# Patient Record
Sex: MALE | Race: OTHER | Employment: FULL TIME | URBAN - METROPOLITAN AREA
[De-identification: names, ages, dates, MRNs, and addresses within clinical notes are randomized per-mention and may not be internally consistent; named-entity substitution may affect disease eponyms.]

---

## 2019-09-07 ENCOUNTER — OFFICE VISIT (OUTPATIENT)
Dept: URGENT CARE | Facility: CLINIC | Age: 64
End: 2019-09-07

## 2019-09-07 VITALS
SYSTOLIC BLOOD PRESSURE: 111 MMHG | TEMPERATURE: 98 F | RESPIRATION RATE: 18 BRPM | DIASTOLIC BLOOD PRESSURE: 72 MMHG | WEIGHT: 165 LBS | OXYGEN SATURATION: 100 % | HEART RATE: 66 BPM | HEIGHT: 67 IN | BODY MASS INDEX: 25.9 KG/M2

## 2019-09-07 DIAGNOSIS — M62.838 MUSCLE SPASM: ICD-10-CM

## 2019-09-07 DIAGNOSIS — M54.6 ACUTE LEFT-SIDED THORACIC BACK PAIN: Primary | ICD-10-CM

## 2019-09-07 PROCEDURE — 96372 THER/PROPH/DIAG INJ SC/IM: CPT | Mod: S$GLB,,, | Performed by: NURSE PRACTITIONER

## 2019-09-07 PROCEDURE — 99204 PR OFFICE/OUTPT VISIT, NEW, LEVL IV, 45-59 MIN: ICD-10-PCS | Mod: 25,S$GLB,, | Performed by: NURSE PRACTITIONER

## 2019-09-07 PROCEDURE — 96372 PR INJECTION,THERAP/PROPH/DIAG2ST, IM OR SUBCUT: ICD-10-PCS | Mod: S$GLB,,, | Performed by: NURSE PRACTITIONER

## 2019-09-07 PROCEDURE — 99204 OFFICE O/P NEW MOD 45 MIN: CPT | Mod: 25,S$GLB,, | Performed by: NURSE PRACTITIONER

## 2019-09-07 RX ORDER — CYCLOBENZAPRINE HCL 10 MG
10 TABLET ORAL 3 TIMES DAILY PRN
Qty: 20 TABLET | Refills: 0 | Status: SHIPPED | OUTPATIENT
Start: 2019-09-07 | End: 2019-09-17

## 2019-09-07 RX ORDER — KETOROLAC TROMETHAMINE 30 MG/ML
60 INJECTION, SOLUTION INTRAMUSCULAR; INTRAVENOUS
Status: COMPLETED | OUTPATIENT
Start: 2019-09-07 | End: 2019-09-07

## 2019-09-07 RX ORDER — DEXAMETHASONE SODIUM PHOSPHATE 100 MG/10ML
10 INJECTION INTRAMUSCULAR; INTRAVENOUS
Status: COMPLETED | OUTPATIENT
Start: 2019-09-07 | End: 2019-09-07

## 2019-09-07 RX ADMIN — KETOROLAC TROMETHAMINE 60 MG: 30 INJECTION, SOLUTION INTRAMUSCULAR; INTRAVENOUS at 05:09

## 2019-09-07 RX ADMIN — DEXAMETHASONE SODIUM PHOSPHATE 10 MG: 100 INJECTION INTRAMUSCULAR; INTRAVENOUS at 05:09

## 2019-09-07 NOTE — PATIENT INSTRUCTIONS
Contracción De Espalda [Sin Lesión] [Back Spasm, No Injury]    La contracción de los músculos de la espalda puede ocurrir luego de dejuan torcedura herlinda de ligamento o torcedura muscular por un movimiento de giro o dejuan agachada brusca. También la causa puede ser dormir en dejuan posición loly o en un colchón de loly calidad. Algunas personas responden a la tensión emocional tensando los músculos de la espalda.  El tratamiento descrito abajo generalmente contribuirá que el dolor desaparezca en 5-7 días. El dolor que continua puede requerir dejuan evaluación mayor u otro tipo de tratamiento mina la terapia física.  A menos que usted haya tenido dejuan lesión física (por ejemplo, dejuan caída o un accidente de automóvil), no suelen pedirse radiografías (X-rays) para la evaluación inicial del dolor de espalda. Si el dolor persiste y no responde al tratamiento médico, es posible que le soliciten radiografías (X-rays) y otras pruebas más adelante.  Cuidado En [place]:  1. Es posible que necesite permanecer en cama los primeros días. Yadi, tan pronto mina le sea posible, comience a sentarse o pararse para evitar los problemas que pueden aparecer cuando luis está en cama mucho tiempo (debilidad de los músculos, mayor dolor y rigidez de la espalda, coágulos de sarbjit en las piernas).  2. Mientras esté en cama, intente buscar dejuan posición que le resulte cómoda. Lo mejor es utilizar un colchón firme. Intente acostarse de espalda con almohadas debajo de las rodillas. También puede intentar acostarse de costado con las rodillas flexionadas cerca del pecho y dejuan almohada entre las rodillas.  3. Evite estar mucho tiempo sentado. Eso causa más tensión en la parte inferior de la espalda que estando de pie o caminando.  4. Nelly los dos primeros días después de la lesión, aplíquese dejuan COMPRESA DE HIELO sobre el área dolorida nelly 20 minutos cada 2 a 4 horas. Ello reducirá la hinchazón y el dolor. El CALOR (dejuan ducha caliente, un baño caliente  o dejuan almohadilla térmica) funciona nicki para los espasmos musculares. Puede comenzar con el hielo y luego pasar al calor después de dos días. Algunos pacientes se sienten mejor alternando los tratamientos con hielo y calor. Emplee el método que mejor le resulte.  5. Puede usar acetaminofén (acetaminophen) [Tylenol] o ibuprofeno (ibuprofen) [Motrin o Advil] para controlar el dolor, a menos que le hayan recetado otro medicamento. [NOTA: Si tiene dejuan enfermedad hepática o renal crónica (chronic liver or kidney disease), o ha tenido alguna vez dejuan úlcera estomacal (stomach ulcer) o sangrado gastrointestinal (GI bleeding), consulte con casiano médico antes de vidya estos medicamentos.]  6. Los estiramientos suaves ayudarán a que casiano espalda sane más rápido. Jose Miguel esta simple rutina de 2-3 veces al día hasta que sienta que casiano espalda está mejor.  ¨ ESTIRAMIENTOS DE LA PARTE BAJA DE LA ESPALDA  § Acuéstese boca ariba con kenny rodillas dobladas y ambos pies sobre el piso.  § Lentamente levante casiano rodilla izquierda hacia casiano pecho aplanando la parte baja de casiano espalda sobre el piso. Sostenga nelly 5 segundos.  § Relájese y repita el ejercicio con casiano rodilla derecha.  § Jose Miguel 10 de estos ejercicios con cada pierna.  § Repita abrazando ambas rodillas y llevándolas hacia casiano pecho al mismo tiempo.  7. Infórmese sobre los métodos que se utilizan para levantar cosas pesadas de manera chacon y utilícelos. No levante nada que pese más de 15 libras (7 kilos) hasta que haya desaparecido el dolor.  Seguimiento  con casiano médico o en esta institución si kenny síntomas no empiezan a mejorar luego de dejuan semana. Puede necesitar terapia física.  [NOTA: Si le hicieron dejuan radiografía, la va a revisar un radiólogo. Le avisarán si encuentran algo que pueda afectar casiano atención]  Busque Prontamente Atención Médica  si algo de lo siguiente ocurre:  · El dolor empeora o se extiende a kenny piernas  · Debilidad o entumecimiento en dejuan o ambas piernas  · Pérdida  del control intestinal o de la vejiga  · Entumecimiento en el área de la kin  · Fiebre repentina superior a 100.4°F (38.0°C)  · Ardor o dolor al orinar  Date Last Reviewed: 6/1/2016  © 3560-2440 [employer]. [address] [place] [address]. Todos los derechos reservados. Esta información no pretende sustituir la atención médica profesional. Sólo casiano médico puede diagnosticar y tratar un problema de ximena.      RETURN TO CLINIC IF SYMPTOMS WORSEN OR CALL 911 IMMEDIATELY FOR SHORTNESS OF BREATH, CHEST PAIN, DIZZINESS, WORSENING PAIN, NAUSEA AND VOMITING, HEART PALPITATIONS, FEVER AND/OR NECK STIFFNESS. FOLLOW UP WITH PRIMARY CARE PROVIDER IN THE AM.    If you were prescribed a narcotic or controlled medication, do not drive or operate heavy equipment or machinery while taking these medications.  You must understand that you've received an Urgent Care treatment only and that you may be released before all your medical problems are known or treated. You, the patient, will arrange for follow up care as instructed.  Follow up with your PCP or specialty clinic as directed in the next 1-2 weeks if not improved or as needed.  You can call [phone] to schedule an appointment with the appropriate provider.  If your condition worsens we recommend that you receive another evaluation at the emergency room immediately or contact your primary medical clinics after hours call service to discuss your concerns.  Please return here or go to the Emergency Department for any concerns or worsening of condition.

## 2019-09-07 NOTE — PROGRESS NOTES
Subjective:       Patient ID: Myke Domingo is a 63 y.o. male.    Chief Complaint: Back Pain (left side)    Pt complains of left back pain. Michelle . Macedonian first language. Pt has back pain after doing heavy lifting and pulling at work on Wednesday. He worked harder due to short staff.     Back Pain   This is a new problem. Episode onset: 4 days. The problem occurs constantly. The problem is unchanged. The pain is present in the lumbar spine. The pain does not radiate. The pain is at a severity of 8/10. The pain is moderate. The symptoms are aggravated by position. Stiffness is present all day. Pertinent negatives include no abdominal pain, bladder incontinence, bowel incontinence, dysuria or numbness. He has tried heat for the symptoms. The treatment provided no relief.     Review of Systems   Constitution: Negative for malaise/fatigue.   Skin: Negative for rash.   Musculoskeletal: Positive for back pain and muscle cramps. Negative for muscle weakness and stiffness.   Gastrointestinal: Negative for abdominal pain and bowel incontinence.   Genitourinary: Negative for bladder incontinence, dysuria, hematuria and urgency.   Neurological: Negative for disturbances in coordination and numbness.       Objective:      Physical Exam   Constitutional: He is oriented to person, place, and time. He appears well-developed and well-nourished. He is cooperative.  Non-toxic appearance. He does not appear ill. No distress.   HENT:   Head: Normocephalic and atraumatic.   Right Ear: Hearing, tympanic membrane, external ear and ear canal normal.   Left Ear: Hearing, tympanic membrane, external ear and ear canal normal.   Nose: Nose normal. No mucosal edema, rhinorrhea or nasal deformity. No epistaxis. Right sinus exhibits no maxillary sinus tenderness and no frontal sinus tenderness. Left sinus exhibits no maxillary sinus tenderness and no frontal sinus tenderness.   Mouth/Throat: Uvula is midline and mucous  membranes are normal. No trismus in the jaw. Normal dentition. No uvula swelling.   Eyes: Pupils are equal, round, and reactive to light. Conjunctivae, EOM and lids are normal. No scleral icterus.   Sclera clear bilat   Neck: Trachea normal, normal range of motion, full passive range of motion without pain and phonation normal. Neck supple.   Cardiovascular: Normal rate, regular rhythm, normal heart sounds, intact distal pulses and normal pulses.   Pulmonary/Chest: Effort normal and breath sounds normal. No respiratory distress.   Abdominal: Soft. Normal appearance and bowel sounds are normal. He exhibits no distension. There is no tenderness.   Musculoskeletal: Normal range of motion. He exhibits no deformity.        Thoracic back: He exhibits tenderness, edema, pain and spasm. He exhibits normal range of motion, no bony tenderness, no swelling, no deformity, no laceration and normal pulse.        Back:    Neurological: He is alert and oriented to person, place, and time. He exhibits normal muscle tone. Coordination normal.   Skin: Skin is warm, dry and intact. He is not diaphoretic. No pallor.   Psychiatric: He has a normal mood and affect. His speech is normal and behavior is normal. Judgment and thought content normal. Cognition and memory are normal.   Nursing note and vitals reviewed.      Assessment:       1. Acute left-sided thoracic back pain    2. Muscle spasm        Plan:         Medications Ordered This Encounter   Medications    cyclobenzaprine (FLEXERIL) 10 MG tablet     Sig: Take 1 tablet (10 mg total) by mouth 3 (three) times daily as needed for Muscle spasms.     Dispense:  20 tablet     Refill:  0    dexamethasone injection 10 mg    ketorolac injection 60 mg            Follow up in about 1 day (around 9/8/2019), or if symptoms worsen or fail to improve.

## 2019-09-09 ENCOUNTER — OFFICE VISIT (OUTPATIENT)
Dept: URGENT CARE | Facility: CLINIC | Age: 64
End: 2019-09-09
Payer: OTHER MISCELLANEOUS

## 2019-09-09 VITALS
SYSTOLIC BLOOD PRESSURE: 111 MMHG | OXYGEN SATURATION: 99 % | DIASTOLIC BLOOD PRESSURE: 78 MMHG | WEIGHT: 165 LBS | HEART RATE: 67 BPM | BODY MASS INDEX: 25.84 KG/M2
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
## 2019-09-09 DIAGNOSIS — S39.012A STRAIN OF LUMBAR REGION, INITIAL ENCOUNTER: Primary | ICD-10-CM

## 2019-09-09 PROCEDURE — 99203 OFFICE O/P NEW LOW 30 MIN: CPT | Mod: S$GLB,,, | Performed by: PHYSICIAN ASSISTANT

## 2019-09-09 PROCEDURE — 99203 PR OFFICE/OUTPT VISIT, NEW, LEVL III, 30-44 MIN: ICD-10-PCS | Mod: S$GLB,,, | Performed by: PHYSICIAN ASSISTANT

## 2019-09-09 RX ORDER — IBUPROFEN 600 MG/1
600 TABLET ORAL EVERY 6 HOURS PRN
Qty: 30 TABLET | Refills: 1 | Status: SHIPPED | OUTPATIENT
Start: 2019-09-09 | End: 2019-10-09

## 2019-09-09 NOTE — LETTER
[facility] [place] [address] [facility], [place] [address]  Phone: [phone]  Fax: [phone]  [facility]: [id number]    Pt Name: Myke Domingo  Injury Date: 09/07/2019   Employee ID:  Date of First Treatment: 09/09/2019   Company: [employer] reference to record [id number]       Appointment Time: 08:25 AM Arrived: 8:25 AM   Provider: Danni Stuart PA-C Time Out: 9:43 AM      Office Treatment:   1. Strain of lumbar region, initial encounter    2. Work related injury      Medications Ordered This Encounter   Medications    ibuprofen (ADVIL,MOTRIN) 600 MG tablet      Patient Instructions: Attention not to aggravate affected area, Daily home exercises/warm soaks, Apply ice 24-48 hours then apply heat/warm soaks    Restrictions: Avoid frequent bending/lifting/twisting, Avoid climbing/kneeling/squatting, No lifting/pushing/pulling more than 10 lbs, Sit down work only     Return Appointment: 9/16/2019 at 8:30 AM

## 2019-09-09 NOTE — PROGRESS NOTES
Subjective:       Patient ID: Myke Domingo is a 63 y.o. male.    Chief Complaint: Back Pain    Pt is Korean speaking, and has an  in clinic with him to translate.    Pt was lifting something on 9/4 when he feels like he pulled a muscle in his lower back on the left side. He was seen in urgent care on 9/7/2019 and diagnosed with a muscle strain.  He was given a prescription for flexeril.  He says the pain is improved from the initial visit, but is still painful.  Pt says the flexeril takes the edge off of the pain and he is using heat as needed    Back Pain   This is a new problem. Episode onset: 9/7. The problem occurs constantly. The problem has been gradually improving since onset. The pain is at a severity of 7/10. Pertinent negatives include no abdominal pain, bladder incontinence, bowel incontinence, chest pain, dysuria or numbness. He has tried heat and muscle relaxant for the symptoms. The treatment provided mild relief.     Review of Systems   Constitution: Negative for malaise/fatigue.   HENT: Negative for congestion.    Cardiovascular: Negative for chest pain.   Respiratory: Negative for shortness of breath.    Skin: Negative for rash.   Musculoskeletal: Positive for back pain. Negative for muscle cramps, muscle weakness and stiffness.   Gastrointestinal: Negative for abdominal pain and bowel incontinence.   Genitourinary: Negative for bladder incontinence, dysuria, hematuria and urgency.   Neurological: Negative for disturbances in coordination and numbness.       Objective:      Physical Exam   Constitutional: He is oriented to person, place, and time. He appears well-developed and well-nourished. No distress.   HENT:   Head: Normocephalic and atraumatic.   Eyes: Conjunctivae are normal.   Neck: Normal range of motion. Neck supple.   Cardiovascular: Normal rate and regular rhythm. Exam reveals no gallop and no friction rub.   No murmur heard.  Pulmonary/Chest: Effort normal and  breath sounds normal. He has no wheezes. He has no rales.   Musculoskeletal:        Lumbar back: He exhibits decreased range of motion, tenderness (left side), pain and spasm. He exhibits no bony tenderness.        Back:    He is able to flex to 90° without pain.  He is unable to extend his back secondary to pain.  He is able to lateral flexion to the right to approximately 20° with pain.  He is able to lateral flex left 30°.  He is able to rotate to the right to approximately 20° with pain.  He is able to rotate to the left at 30°.   Neurological: He is alert and oriented to person, place, and time.   Skin: Skin is warm and dry. No rash noted. No erythema.   Psychiatric: He has a normal mood and affect. His behavior is normal. Judgment and thought content normal.   Nursing note and vitals reviewed.      Assessment:       1. Strain of lumbar region, initial encounter    2. Work related injury        Plan:         Medications Ordered This Encounter   Medications    ibuprofen (ADVIL,MOTRIN) 600 MG tablet     Sig: Take 1 tablet (600 mg total) by mouth every 6 (six) hours as needed for Pain.     Dispense:  30 tablet     Refill:  1     Patient Instructions: Attention not to aggravate affected area, Daily home exercises/warm soaks, Apply ice 24-48 hours then apply heat/warm soaks   Restrictions: Avoid frequent bending/lifting/twisting, Avoid climbing/kneeling/squatting, No lifting/pushing/pulling more than 10 lbs, Sit down work only  Follow up in about 1 week (around 9/16/2019).

## 2019-09-16 ENCOUNTER — OFFICE VISIT (OUTPATIENT)
Dept: URGENT CARE | Facility: CLINIC | Age: 64
End: 2019-09-16

## 2019-09-16 VITALS
OXYGEN SATURATION: 98 % | SYSTOLIC BLOOD PRESSURE: 114 MMHG | HEART RATE: 64 BPM | BODY MASS INDEX: 25.84 KG/M2 | WEIGHT: 165 LBS | DIASTOLIC BLOOD PRESSURE: 80 MMHG

## 2019-09-16 DIAGNOSIS — M62.838 MUSCLE SPASM: ICD-10-CM
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
## 2019-09-16 DIAGNOSIS — S39.012D STRAIN OF LUMBAR REGION, SUBSEQUENT ENCOUNTER: Primary | ICD-10-CM

## 2019-09-16 PROCEDURE — 99214 PR OFFICE/OUTPT VISIT, EST, LEVL IV, 30-39 MIN: ICD-10-PCS | Mod: S$GLB,,, | Performed by: PHYSICIAN ASSISTANT

## 2019-09-16 PROCEDURE — 99214 OFFICE O/P EST MOD 30 MIN: CPT | Mod: S$GLB,,, | Performed by: PHYSICIAN ASSISTANT

## 2019-09-16 NOTE — PROGRESS NOTES
Subjective:       Patient ID: Myke Domingo is a 64 y.o. male.    Chief Complaint: Back Pain    Pt is using heat on the lower back for any pain along with the muscle relaxer at night & motrin during the day. Pt reports no pain today    Back Pain   This is a new problem. The current episode started 1 to 4 weeks ago. The problem occurs intermittently. The pain is at a severity of 0/10. The patient is experiencing no pain. Pertinent negatives include no abdominal pain, bladder incontinence, bowel incontinence, chest pain, dysuria or numbness. He has tried muscle relaxant, NSAIDs and heat for the symptoms.     Review of Systems   Constitution: Negative for malaise/fatigue.   HENT: Negative for congestion.    Cardiovascular: Negative for chest pain.   Respiratory: Negative for shortness of breath.    Skin: Negative for rash.   Musculoskeletal: Positive for back pain. Negative for muscle cramps, muscle weakness and stiffness.   Gastrointestinal: Negative for abdominal pain and bowel incontinence.   Genitourinary: Negative for bladder incontinence, dysuria, hematuria and urgency.   Neurological: Negative for disturbances in coordination and numbness.       Objective:      Physical Exam   Constitutional: He is oriented to person, place, and time. He appears well-developed and well-nourished. No distress.   HENT:   Head: Normocephalic and atraumatic.   Eyes: Conjunctivae are normal.   Neck: Normal range of motion. Neck supple.   Cardiovascular: Normal rate and regular rhythm. Exam reveals no gallop and no friction rub.   No murmur heard.  Pulmonary/Chest: Effort normal and breath sounds normal. He has no wheezes. He has no rales.   Musculoskeletal: Normal range of motion.        Lumbar back: He exhibits no tenderness, no bony tenderness, no pain and no spasm.   He is able to flex to 90° without pain.  He is able to lateral flexion in both directions to 30°.  He is able to rotate in both directions to 30°.    Neurological: He is alert and oriented to person, place, and time.   Skin: Skin is warm and dry. No rash noted. No erythema.   Psychiatric: He has a normal mood and affect. His behavior is normal. Judgment and thought content normal.   Nursing note and vitals reviewed.      Assessment:       1. Strain of lumbar region, subsequent encounter    2. Work related injury    3. Muscle spasm        Plan:            Patient Instructions: Attention not to aggravate affected area, Daily home exercises/warm soaks, Apply ice 24-48 hours then apply heat/warm soaks   Restrictions: Avoid frequent bending/lifting/twisting, Avoid climbing/kneeling/squatting, No lifting/pushing/pulling more than 10 lbs, Sit down work only  Follow up in about 3 days (around 9/19/2019).

## 2019-09-16 NOTE — LETTER
[facility] [place] [address] [facility], [place] [address]  Phone: [phone]  Fax: [phone]  [facility]: [id number]    Pt Name: Myke Domingo  Injury Date: 09/07/2019   Employee ID:  Date of First Treatment: 09/16/2019   Company: [employer] reference to record [employer]       Appointment Time: 08:15 AM Arrived: 8:15 AM   Provider: Danni Stuart PA-C Time Out: 8:53 AM      Office Treatment:   1. Strain of lumbar region, subsequent encounter    2. Work related injury    3. Muscle spasm          Patient Instructions: Attention not to aggravate affected area, Daily home exercises/warm soaks, Apply ice 24-48 hours then apply heat/warm soaks    Restrictions: Avoid frequent bending/lifting/twisting, Avoid climbing/kneeling/squatting, No lifting/pushing/pulling more than 10 lbs, Sit down work only     Return Appointment: 9/19/2019 at 9:00 AM

## 2019-09-19 ENCOUNTER — OFFICE VISIT (OUTPATIENT)
Dept: URGENT CARE | Facility: CLINIC | Age: 64
End: 2019-09-19
Payer: OTHER MISCELLANEOUS

## 2019-09-19 VITALS
WEIGHT: 165 LBS | OXYGEN SATURATION: 97 % | SYSTOLIC BLOOD PRESSURE: 124 MMHG | BODY MASS INDEX: 25.84 KG/M2 | DIASTOLIC BLOOD PRESSURE: 85 MMHG | HEART RATE: 62 BPM
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
## 2019-09-19 DIAGNOSIS — S39.012D STRAIN OF LUMBAR REGION, SUBSEQUENT ENCOUNTER: Primary | ICD-10-CM

## 2019-09-19 DIAGNOSIS — M62.838 MUSCLE SPASM: ICD-10-CM

## 2019-09-19 PROCEDURE — 99214 PR OFFICE/OUTPT VISIT, EST, LEVL IV, 30-39 MIN: ICD-10-PCS | Mod: S$GLB,,, | Performed by: PHYSICIAN ASSISTANT

## 2019-09-19 PROCEDURE — 99214 OFFICE O/P EST MOD 30 MIN: CPT | Mod: S$GLB,,, | Performed by: PHYSICIAN ASSISTANT

## 2019-09-19 NOTE — PROGRESS NOTES
Subjective:       Patient ID: Myke Domingo is a 64 y.o. male.    Chief Complaint: Back Pain    Pt reports no pain today, pt avoids things that aggravates the back.     Back Pain   This is a new problem. The current episode started 1 to 4 weeks ago. The problem occurs rarely. The pain is at a severity of 0/10. The patient is experiencing no pain. Pertinent negatives include no abdominal pain, chest pain, fever or headaches.     Review of Systems   Constitution: Negative for chills and fever.   HENT: Negative for sore throat.    Eyes: Negative for blurred vision.   Cardiovascular: Negative for chest pain.   Respiratory: Negative for shortness of breath.    Skin: Negative for rash.   Musculoskeletal: Negative for back pain and joint pain.   Gastrointestinal: Negative for abdominal pain, diarrhea, nausea and vomiting.   Neurological: Negative for headaches.   Psychiatric/Behavioral: The patient is not nervous/anxious.        Objective:      Physical Exam   Constitutional: He is oriented to person, place, and time. He appears well-developed and well-nourished. No distress.   HENT:   Head: Normocephalic and atraumatic.   Eyes: Conjunctivae are normal.   Neck: Normal range of motion. Neck supple.   Cardiovascular: Normal rate and regular rhythm. Exam reveals no gallop and no friction rub.   No murmur heard.  Pulmonary/Chest: Effort normal and breath sounds normal. He has no wheezes. He has no rales.   Musculoskeletal: Normal range of motion.        Lumbar back: He exhibits no tenderness, no bony tenderness, no pain and no spasm.   He is able to flex to 90° without pain.  He is able to lateral flexion in both directions to 30°.  He is able to rotate in both directions to 30°.   Neurological: He is alert and oriented to person, place, and time.   Skin: Skin is warm and dry. No rash noted. No erythema.   Psychiatric: He has a normal mood and affect. His behavior is normal. Judgment and thought content normal.    Nursing note and vitals reviewed.      8:25 AM - Patient released to regular duty.  He will return next week for reevaluation and most likely DC from [facility].    Assessment:       1. Strain of lumbar region, subsequent encounter    2. Work related injury    3. Muscle spasm        Plan:            Patient Instructions: Attention not to aggravate affected area, Daily home exercises/warm soaks   Restrictions: Regular Duty  Follow up in about 6 days (around 9/25/2019).

## 2019-09-25 ENCOUNTER — OFFICE VISIT (OUTPATIENT)
Dept: URGENT CARE | Facility: CLINIC | Age: 64
End: 2019-09-25
Payer: OTHER MISCELLANEOUS

## 2019-09-25 VITALS
BODY MASS INDEX: 25.84 KG/M2 | SYSTOLIC BLOOD PRESSURE: 109 MMHG | HEART RATE: 64 BPM | DIASTOLIC BLOOD PRESSURE: 67 MMHG | OXYGEN SATURATION: 99 % | WEIGHT: 165 LBS
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
## 2019-09-25 DIAGNOSIS — M62.838 MUSCLE SPASM: ICD-10-CM

## 2019-09-25 DIAGNOSIS — S39.012D STRAIN OF LUMBAR REGION, SUBSEQUENT ENCOUNTER: Primary | ICD-10-CM

## 2019-09-25 PROCEDURE — 99214 PR OFFICE/OUTPT VISIT, EST, LEVL IV, 30-39 MIN: ICD-10-PCS | Mod: S$GLB,,, | Performed by: PHYSICIAN ASSISTANT

## 2019-09-25 PROCEDURE — 99214 OFFICE O/P EST MOD 30 MIN: CPT | Mod: S$GLB,,, | Performed by: PHYSICIAN ASSISTANT

## 2019-09-25 NOTE — LETTER
[facility] [place] [address] [facility], [place] [address]  Phone: [phone]  Fax: [phone]  [facility]: [id number]    Pt Name: Myke Domingo  Injury Date: 09/07/2019   Employee ID:  Date of First Treatment: 09/25/2019   Company: [employer] reference to record [employer]       Appointment Time: 07:55 AM Arrived: 7:55 AM   Provider: Danni Stuart PA-C Time Out: 8:20 AM      Office Treatment:   1. Strain of lumbar region, subsequent encounter    2. Work related injury    3. Muscle spasm          Patient Instructions: Attention not to aggravate affected area    Restrictions: Regular Duty, Discharged from Occupational Health     Return Appointment: None.  Follow up if symptoms worsen or fail to improve.

## 2019-09-25 NOTE — PROGRESS NOTES
Subjective:       Patient ID: Myke Domingo is a 64 y.o. male.    Chief Complaint: Back Pain    Pt reports no back pain today and is doing really well.  He had no problems with regular duty at work.    Back Pain   This is a new problem. The pain is at a severity of 0/10. The patient is experiencing no pain. Pertinent negatives include no abdominal pain, bladder incontinence, bowel incontinence, dysuria or numbness. He has tried NSAIDs for the symptoms.     Review of Systems   Constitution: Negative for malaise/fatigue.   Skin: Negative for rash.   Musculoskeletal: Negative for back pain, muscle cramps, muscle weakness and stiffness.   Gastrointestinal: Negative for abdominal pain and bowel incontinence.   Genitourinary: Negative for bladder incontinence, dysuria, hematuria and urgency.   Neurological: Negative for disturbances in coordination and numbness.       Objective:      Physical Exam   Constitutional: He is oriented to person, place, and time. He appears well-developed and well-nourished. No distress.   HENT:   Head: Normocephalic and atraumatic.   Eyes: Conjunctivae are normal.   Neck: Normal range of motion. Neck supple.   Cardiovascular: Normal rate and regular rhythm. Exam reveals no gallop and no friction rub.   No murmur heard.  Pulmonary/Chest: Effort normal and breath sounds normal. He has no wheezes. He has no rales.   Musculoskeletal: Normal range of motion.        Lumbar back: He exhibits no tenderness, no bony tenderness, no pain and no spasm.   He is able to flex to 90° without pain.  He is able to lateral flexion in both directions to 30°.  He is able to rotate in both directions to 30°.   Neurological: He is alert and oriented to person, place, and time.   Skin: Skin is warm and dry. No rash noted. No erythema.   Psychiatric: He has a normal mood and affect. His behavior is normal. Judgment and thought content normal.   Nursing note and vitals reviewed.      Assessment:       1.  Strain of lumbar region, subsequent encounter    2. Work related injury    3. Muscle spasm        Plan:            Patient Instructions: Attention not to aggravate affected area   Restrictions: Regular Duty, Discharged from Occupational Health  Follow up if symptoms worsen or fail to improve.

## 2019-11-12 ENCOUNTER — OFFICE VISIT (OUTPATIENT)
Dept: URGENT CARE | Facility: CLINIC | Age: 64
End: 2019-11-12
Payer: OTHER MISCELLANEOUS

## 2019-11-12 VITALS
RESPIRATION RATE: 17 BRPM | HEART RATE: 68 BPM | TEMPERATURE: 97 F | BODY MASS INDEX: 24.96 KG/M2 | HEIGHT: 67 IN | WEIGHT: 159 LBS | SYSTOLIC BLOOD PRESSURE: 131 MMHG | DIASTOLIC BLOOD PRESSURE: 73 MMHG | OXYGEN SATURATION: 97 %
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
## 2019-11-12 PROCEDURE — 86592 SYPHILIS TEST NON-TREP QUAL: CPT

## 2019-11-12 PROCEDURE — 99214 PR OFFICE/OUTPT VISIT, EST, LEVL IV, 30-39 MIN: ICD-10-PCS | Mod: S$GLB,,, | Performed by: FAMILY MEDICINE

## 2019-11-12 PROCEDURE — 80074 HEPATITIS PANEL, ACUTE: ICD-10-PCS | Mod: S$GLB,,, | Performed by: FAMILY MEDICINE

## 2019-11-12 PROCEDURE — 99214 OFFICE O/P EST MOD 30 MIN: CPT | Mod: S$GLB,,, | Performed by: FAMILY MEDICINE

## 2019-11-12 PROCEDURE — 80074 ACUTE HEPATITIS PANEL: CPT | Mod: S$GLB,,, | Performed by: FAMILY MEDICINE

## 2019-11-12 PROCEDURE — 86703 HIV 1 / 2 ANTIBODY: ICD-10-PCS | Mod: S$GLB,,, | Performed by: FAMILY MEDICINE

## 2019-11-12 PROCEDURE — 86703 HIV-1/HIV-2 1 RESULT ANTBDY: CPT

## 2019-11-12 PROCEDURE — 80074 ACUTE HEPATITIS PANEL: CPT

## 2019-11-12 PROCEDURE — 86592 SYPHILIS TEST NON-TREP QUAL: CPT | Mod: S$GLB,,, | Performed by: FAMILY MEDICINE

## 2019-11-12 PROCEDURE — 86703 HIV-1/HIV-2 1 RESULT ANTBDY: CPT | Mod: S$GLB,,, | Performed by: FAMILY MEDICINE

## 2019-11-12 PROCEDURE — 86592 RPR: ICD-10-PCS | Mod: S$GLB,,, | Performed by: FAMILY MEDICINE

## 2019-11-12 NOTE — PATIENT INSTRUCTIONS
PLEASE READ YOUR DISCHARGE INSTRUCTIONS ENTIRELY AS IT CONTAINS IMPORTANT INFORMATION.    We will call with the results in about 5 days    Please have your blood tested again in 6 weeks with occupational health    Please see Occupational Health if he develops any redness fever swelling drainage    Wash with antibacterial soap and put Neosporin on the area    Call [phone] [facility] for occupational health    You must understand that you have received an Urgent Care treatment only and that you may be released before all of your medical problems are known or treated.    Body Fluid Exposure (Non-Occupational)  Two serious illnesses can be spread through body fluid exposure:  · HIV  · Hepatitis (types B, C and D)  Most people exposed 1 time to the body fluid of a person infected with HIV or hepatitis do not get the virus. However, exposure must be taken very seriously. Both HIV and hepatitis virus infection can lead to chronic illness and death. The risk of infection depends on the type of exposure.  Type of exposure to + HIV source Risk   Needle stick  3 out of 1000 exposures   Needle sharing with drug injection          7 out of 1000 exposures   Anal, vaginal, oral intercourse 1 or less per 1000 sex acts, but for receptive anal intercourse it is 1 per 200 sex acts     [Receptive anal intercourse is the highest risk]   If you have not been immunized against Hepatitis B, the risk of becoming infected with Hepatitis B and C after a single exposure is much higher than with HIV. For needle stick or sexual exposures, the risk is 6% to 30% (6 to 30 out of 100 exposures) for Hepatitis B. The risk of becoming infected after similar exposure to someone with Hepatitis C is 1% to 10% (1 to10 out of 100 exposures).  If you are in a sexual relationship, discuss your exposure and its risks with your partner. Consider abstaining from sex or using condoms and avoiding pregnancy until test results of the person who exposed you is negative,  or your follow-up testing is done. Do not donate blood, tissue, or semen. If you are a woman who is breast feeding, discuss the risks to your infant with your doctor.  Testing  Initial testing for HIV and hepatitis status will be done on you today. If the HIV and hepatitis status of the person you were exposed to is not known, try to make sure to have that person tested. If that person is positive or unknown, and your results are negative, you will need to have more blood tests at a later time to find out if infection has occurred. It can take up to 8 weeks for blood tests to turn positive for hepatitis. If HIV infection has occurred, the test usually becomes positive by 3 months after exposure, but a positive result could be delayed up to 6 months after exposure. Therefore, repeat HIV testing may be done in 6 and 12 weeks, and again at 6 months after exposure. If tests are negative for hepatitis and HIV on final follow-up testing, you can assume that you were not infected as a result of this exposure.  Post-exposure prophylaxis (PEP)  To protect you from Hepatitis B, treatment will depend on the status of the person who exposed you, and whether you have been previously vaccinated. If you have not been previously vaccinated, you can receive the first dose of the vaccination series today.  There is no preventive treatment or vaccine for hepatitis C or D.  Based on how recently the exposure occurred, the type of  exposure, and the risk of HIV in the person who you were exposed to, preventive treatment with antiviral medicine may be advised. Treatment consists of 2 or 3 oral medicines taken 1 to 2 times a day for 4 weeks. It is recommended to start the treatment as soon as possible after the exposure. Since treatment may be started before test results are known, it can be stopped if the source patient test results are negative.  Facts you need to know before making a treatment decision  · There is only limited  information about the effectiveness and toxicity of the drugs used for post exposure prophylaxis in people without HIV infection.  · Although the short-term toxicity of anti-viral drugs is usually limited, serious adverse events have occurred.  · Be sure you understand the risk of transmission of disease and the risk/benefit of treatment before making your decision. If you are not sure, ask for more information.  · You may refuse or stop post exposure prophylaxix treatment at any time.  When to seek medical advice  Call your healthcare provider right away if any of these occur:  · Unexplained fever over 100.4°F (38.0°C), or as advised  · Swollen lymph glands  · Sore throat  · Rash  · Muscle or joint aching  · Prolonged or recurring diarrhea, nausea, or vomiting  · Frequent headaches  · Dark urine or light colored stools  · Jaundice (yellow color to skin or eyes)  · Abdominal pain  · Unusual and prolonged fatigue  Date Last Reviewed: 7/2/2016  © 6206-1146 [employer]. [address] [place] [address], [place] [address]. All rights reserved. This information is not intended as a substitute for professional medical care. Always follow your healthcare professional's instructions.

## 2019-11-12 NOTE — LETTER
[facility]  [address] [address]  Phone: [phone]  Fax: [phone]  [facility]: [phone]    Pt Name: Myke Domingo  Injury Date: 11/12/2019   Employee ID:  Date of First Treatment: 11/12/2019   Company: [id number]       Appointment Time: 09:15 AM Arrived: 930am    Time Out:1030am     Office Treatment:   1. Accident caused by hypodermic needle, initial encounter               Restrictions: Regular Duty     Return Appointment: 6 weeks

## 2019-11-12 NOTE — PROGRESS NOTES
"Subjective:       Patient ID: Myke Domingo is a 64 y.o. male.    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  This happened yesterday.  His tetanus is up-to-date.  He is currently feeling fine no fever chills body aches, no redness or drainage from the area.  He cleaned it with soap and water after the incident.  [employer] was used for translation    Patient brought in the needle, it was a very small needle with a purple cap.  There was no syringe attached to the needle.  It was disposed of in the sharps bin    Other   This is a new problem. The current episode started yesterday. The problem has been unchanged. Pertinent negatives include no chills, fever, rash or sore throat. Nothing aggravates the symptoms. He has tried nothing for the symptoms. The treatment provided no relief.     Review of Systems   Constitution: Negative for chills and fever.   HENT: Negative for sore throat.    Respiratory: Negative for shortness of breath.    Skin: Negative for itching and rash.        Puncture wound to the right thumb   Musculoskeletal: Negative for joint pain.   All other systems reviewed and are negative.      Objective:      Physical Exam   Constitutional: He is oriented to person, place, and time. He appears well-developed and well-nourished.   HENT:   Head: Normocephalic and atraumatic. Head is without abrasion, without contusion and without laceration.   Right Ear: External ear normal.   Left Ear: External ear normal.   Nose: Nose normal.   Mouth/Throat: Oropharynx is clear and moist.   Eyes: Pupils are equal, round, and reactive to light. Conjunctivae, EOM and lids are normal.   Neck: Trachea normal, full passive range of motion without " pain and phonation normal. Neck supple.   Cardiovascular: Normal rate, regular rhythm and normal heart sounds.   Pulmonary/Chest: Effort normal and breath sounds normal. No stridor. No respiratory distress.   Musculoskeletal: Normal range of motion.   Neurological: He is alert and oriented to person, place, and time.   Skin: Skin is warm, dry and intact. Capillary refill takes less than 2 seconds. No abrasion, no bruising, no burn, no ecchymosis, no laceration, no lesion and no rash noted. No erythema.   Puncture wound to the right thumb without redness or drainage   Psychiatric: He has a normal mood and affect. His speech is normal and behavior is normal. Judgment and thought content normal. Cognition and memory are normal.   Nursing note and vitals reviewed.      Assessment:       1. Accident caused by hypodermic needle, initial encounter        Plan:       HIV syphilis hep panel done in the clinic, advised retesting in 6 weeks.  Return to clinic or follow up with occ health sooner for any signs of infection            Follow up for 6 weeks or sooner for any signs of infection.        Patient Instructions     PLEASE READ YOUR DISCHARGE INSTRUCTIONS ENTIRELY AS IT CONTAINS IMPORTANT INFORMATION.    We will call with the results in about 5 days    Please have your blood tested again in 6 weeks with occupational health    Please see Occupational Health if he develops any redness fever swelling drainage    Wash with antibacterial soap and put Neosporin on the area    Call [phone] for occupational health    You must understand that you have received an Urgent Care treatment only and that you may be released before all of your medical problems are known or treated.    Body Fluid Exposure (Non-Occupational)  Two serious illnesses can be spread through body fluid exposure:  · HIV  · Hepatitis (types B, C and D)  Most people exposed 1 time to the body fluid of a person infected with HIV or hepatitis do not get the  virus. However, exposure must be taken very seriously. Both HIV and hepatitis virus infection can lead to chronic illness and death. The risk of infection depends on the type of exposure.  Type of exposure to + HIV source Risk   Needle stick  3 out of 1000 exposures   Needle sharing with drug injection          7 out of 1000 exposures   Anal, vaginal, oral intercourse 1 or less per 1000 sex acts, but for receptive anal intercourse it is 1 per 200 sex acts     [Receptive anal intercourse is the highest risk]   If you have not been immunized against Hepatitis B, the risk of becoming infected with Hepatitis B and C after a single exposure is much higher than with HIV. For needle stick or sexual exposures, the risk is 6% to 30% (6 to 30 out of 100 exposures) for Hepatitis B. The risk of becoming infected after similar exposure to someone with Hepatitis C is 1% to 10% (1 to10 out of 100 exposures).  If you are in a sexual relationship, discuss your exposure and its risks with your partner. Consider abstaining from sex or using condoms and avoiding pregnancy until test results of the person who exposed you is negative, or your follow-up testing is done. Do not donate blood, tissue, or semen. If you are a woman who is breast feeding, discuss the risks to your infant with your doctor.  Testing  Initial testing for HIV and hepatitis status will be done on you today. If the HIV and hepatitis status of the person you were exposed to is not known, try to make sure to have that person tested. If that person is positive or unknown, and your results are negative, you will need to have more blood tests at a later time to find out if infection has occurred. It can take up to 8 weeks for blood tests to turn positive for hepatitis. If HIV infection has occurred, the test usually becomes positive by 3 months after exposure, but a positive result could be delayed up to 6 months after exposure. Therefore, repeat HIV testing may be done  in 6 and 12 weeks, and again at 6 months after exposure. If tests are negative for hepatitis and HIV on final follow-up testing, you can assume that you were not infected as a result of this exposure.  Post-exposure prophylaxis (PEP)  To protect you from Hepatitis B, treatment will depend on the status of the person who exposed you, and whether you have been previously vaccinated. If you have not been previously vaccinated, you can receive the first dose of the vaccination series today.  There is no preventive treatment or vaccine for hepatitis C or D.  Based on how recently the exposure occurred, the type of  exposure, and the risk of HIV in the person who you were exposed to, preventive treatment with antiviral medicine may be advised. Treatment consists of 2 or 3 oral medicines taken 1 to 2 times a day for 4 weeks. It is recommended to start the treatment as soon as possible after the exposure. Since treatment may be started before test results are known, it can be stopped if the source patient test results are negative.  Facts you need to know before making a treatment decision  · There is only limited information about the effectiveness and toxicity of the drugs used for post exposure prophylaxis in people without HIV infection.  · Although the short-term toxicity of anti-viral drugs is usually limited, serious adverse events have occurred.  · Be sure you understand the risk of transmission of disease and the risk/benefit of treatment before making your decision. If you are not sure, ask for more information.  · You may refuse or stop post exposure prophylaxix treatment at any time.  When to seek medical advice  Call your healthcare provider right away if any of these occur:  · Unexplained fever over 100.4°F (38.0°C), or as advised  · Swollen lymph glands  · Sore throat  · Rash  · Muscle or joint aching  · Prolonged or recurring diarrhea, nausea, or vomiting  · Frequent headaches  · Dark urine or light colored  stools  · Jaundice (yellow color to skin or eyes)  · Abdominal pain  · Unusual and prolonged fatigue  Date Last Reviewed: 7/2/2016  © 5877-3169 [employer]. [address] [place] [address], [place] [address]. All rights reserved. This information is not intended as a substitute for professional medical care. Always follow your healthcare professional's instructions.

## 2019-11-13 LAB — RPR SER QL: NORMAL

## 2019-11-14 LAB
HAV IGM SERPL QL IA: NEGATIVE
HBV CORE IGM SERPL QL IA: NEGATIVE
HBV SURFACE AG SERPL QL IA: NEGATIVE
HCV AB SERPL QL IA: NEGATIVE
HIV 1+2 AB+HIV1 P24 AG SERPL QL IA: NEGATIVE

## 2019-11-15 ENCOUNTER — TELEPHONE (OUTPATIENT)
Dept: URGENT CARE | Facility: CLINIC | Age: 64
End: 2019-11-15

## 2019-11-15 NOTE — TELEPHONE ENCOUNTER
Spoke with patient and  (manager) to give patient his negative results.      ----- Message from Mari Wright PA-C sent at 11/14/2019 11:56 AM CST -----  Hep panel negative. HIV results pending.

## 2019-11-20 ENCOUNTER — TELEPHONE (OUTPATIENT)
Dept: URGENT CARE | Facility: CLINIC | Age: 64
End: 2019-11-20

## 2019-12-27 ENCOUNTER — OFFICE VISIT (OUTPATIENT)
Dept: URGENT CARE | Facility: CLINIC | Age: 64
End: 2019-12-27
Payer: OTHER MISCELLANEOUS

## 2019-12-27 VITALS
SYSTOLIC BLOOD PRESSURE: 108 MMHG | WEIGHT: 156 LBS | BODY MASS INDEX: 24.48 KG/M2 | DIASTOLIC BLOOD PRESSURE: 70 MMHG | RESPIRATION RATE: 15 BRPM | HEART RATE: 68 BPM | TEMPERATURE: 97 F | HEIGHT: 67 IN
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
## 2019-12-27 DIAGNOSIS — B00.9 HERPES: ICD-10-CM

## 2019-12-27 DIAGNOSIS — Z77.21 EXPOSURE TO BODY FLUIDS BY CONTAMINATED HYPODERMIC NEEDLE STICK: Primary | ICD-10-CM

## 2019-12-27 PROCEDURE — 86703 HIV 1 / 2 ANTIBODY: ICD-10-PCS | Mod: S$GLB,,, | Performed by: NURSE PRACTITIONER

## 2019-12-27 PROCEDURE — 99213 PR OFFICE/OUTPT VISIT, EST, LEVL III, 20-29 MIN: ICD-10-PCS | Mod: S$GLB,,, | Performed by: NURSE PRACTITIONER

## 2019-12-27 PROCEDURE — 86592 SYPHILIS TEST NON-TREP QUAL: CPT

## 2019-12-27 PROCEDURE — 86703 HIV-1/HIV-2 1 RESULT ANTBDY: CPT

## 2019-12-27 PROCEDURE — 80074 ACUTE HEPATITIS PANEL: CPT | Mod: S$GLB,,, | Performed by: NURSE PRACTITIONER

## 2019-12-27 PROCEDURE — 80074 HEPATITIS PANEL, ACUTE: ICD-10-PCS | Mod: S$GLB,,, | Performed by: NURSE PRACTITIONER

## 2019-12-27 PROCEDURE — 86592 SYPHILIS TEST NON-TREP QUAL: CPT | Mod: S$GLB,,, | Performed by: NURSE PRACTITIONER

## 2019-12-27 PROCEDURE — 86696 HERPES SIMPLEX TYPE 2 TEST: CPT

## 2019-12-27 PROCEDURE — 80074 ACUTE HEPATITIS PANEL: CPT

## 2019-12-27 PROCEDURE — 86703 HIV-1/HIV-2 1 RESULT ANTBDY: CPT | Mod: S$GLB,,, | Performed by: NURSE PRACTITIONER

## 2019-12-27 PROCEDURE — 99213 OFFICE O/P EST LOW 20 MIN: CPT | Mod: S$GLB,,, | Performed by: NURSE PRACTITIONER

## 2019-12-27 PROCEDURE — 86592 RPR: ICD-10-PCS | Mod: S$GLB,,, | Performed by: NURSE PRACTITIONER

## 2019-12-27 NOTE — PATIENT INSTRUCTIONS
RETURN TO CLINIC IF SYMPTOMS WORSEN OR CALL [phone] IMMEDIATELY FOR SHORTNESS OF BREATH, CHEST PAIN, DIZZINESS, WORSENING PAIN, NAUSEA AND VOMITING, HEART PALPITATIONS, FEVER AND/OR NECK STIFFNESS. FOLLOW UP WITH PRIMARY CARE PROVIDER IN THE AM.    If you were prescribed a narcotic or controlled medication, do not drive or operate heavy equipment or machinery while taking these medications.  You must understand that you've received an Urgent Care treatment only and that you may be released before all your medical problems are known or treated. You, the patient, will arrange for follow up care as instructed.  Follow up with your PCP or specialty clinic as directed in the next 1-2 weeks if not improved or as needed.  You can call [phone] to schedule an appointment with the appropriate provider.  If your condition worsens we recommend that you receive another evaluation at the emergency room immediately or contact your primary medical clinics after hours call service to discuss your concerns.  Please return here or go to the Emergency Department for any concerns or worsening of condition.

## 2019-12-27 NOTE — PROGRESS NOTES
"Subjective:       Patient ID: Myke Domingo is a 64 y.o. male.    Vitals:  height is 5' 7" (1.702 m) and weight is 70.8 kg (156 lb). His temperature is 97.1 °F (36.2 °C). His blood pressure is 108/70 and his pulse is 68. His respiration is 15.     Chief Complaint: Body Fluid Exposure    Patient is here for another needle check.    Exposure to STD   The current episode started today. Pertinent negatives include no chest pain, chills, coughing, diarrhea, dysuria, fever, frequency, headaches, nausea, rash, shortness of breath, sore throat, urgency or vomiting.       Constitution: Negative for chills, fatigue and fever.   HENT: Negative for congestion and sore throat.    Neck: Negative for painful lymph nodes.   Cardiovascular: Negative for chest pain and leg swelling.   Eyes: Negative.  Negative for double vision and blurred vision.   Respiratory: Negative.  Negative for cough and shortness of breath.    Gastrointestinal: Negative.  Negative for nausea, vomiting and diarrhea.   Genitourinary: Negative.  Negative for dysuria, frequency and urgency.   Musculoskeletal: Negative.  Negative for joint pain, joint swelling, muscle cramps and muscle ache.   Skin: Negative for color change, pale and rash.   Allergic/Immunologic: Negative.  Negative for seasonal allergies.   Neurological: Negative.  Negative for dizziness, history of vertigo, light-headedness, passing out and headaches.   Hematologic/Lymphatic: Negative.  Negative for swollen lymph nodes, easy bruising/bleeding and history of blood clots. Does not bruise/bleed easily.   Psychiatric/Behavioral: Negative.  Negative for nervous/anxious, sleep disturbance and depression. The patient is not nervous/anxious.        Objective:      Physical Exam   Constitutional: He is oriented to person, place, and time. He appears well-developed and well-nourished. He is cooperative.  Non-toxic appearance. He does not have a sickly appearance. He does not appear ill. No " distress.   HENT:   Head: Normocephalic and atraumatic.   Right Ear: Hearing, tympanic membrane, external ear and ear canal normal.   Left Ear: Hearing, tympanic membrane, external ear and ear canal normal.   Nose: Nose normal. No mucosal edema, rhinorrhea or nasal deformity. No epistaxis. Right sinus exhibits no maxillary sinus tenderness and no frontal sinus tenderness. Left sinus exhibits no maxillary sinus tenderness and no frontal sinus tenderness.   Mouth/Throat: Uvula is midline, oropharynx is clear and moist and mucous membranes are normal. No trismus in the jaw. Normal dentition. No uvula swelling. No oropharyngeal exudate, posterior oropharyngeal edema or posterior oropharyngeal erythema.   Eyes: Conjunctivae and lids are normal. Right eye exhibits no discharge. Left eye exhibits no discharge. No scleral icterus.   Neck: Trachea normal, normal range of motion, full passive range of motion without pain and phonation normal. Neck supple. No neck rigidity. No edema and no erythema present.   Cardiovascular: Normal rate, regular rhythm, normal heart sounds, intact distal pulses and normal pulses.   Pulmonary/Chest: Effort normal and breath sounds normal. No respiratory distress. He has no decreased breath sounds. He has no rhonchi.   Abdominal: Soft. Normal appearance and bowel sounds are normal. He exhibits no distension, no pulsatile midline mass and no mass. There is no tenderness.   Musculoskeletal: Normal range of motion. He exhibits no edema or deformity.   Neurological: He is alert and oriented to person, place, and time. He exhibits normal muscle tone. Coordination normal.   Skin: Skin is warm, dry, intact, not diaphoretic and not pale.   Psychiatric: He has a normal mood and affect. His speech is normal and behavior is normal. Judgment and thought content normal. Cognition and memory are normal.   Nursing note and vitals reviewed.        Assessment:       1. Exposure to body fluids by contaminated  hypodermic needle stick        Plan:         Exposure to body fluids by contaminated hypodermic needle stick  -     RPR  -     Hepatitis panel, acute  -     HIV 1/2 Ag/Ab (4th Gen)  -     Herpes Simplex Virus (HSV) Types 1 & 2, IgG, Herpes Titer

## 2019-12-28 LAB — RPR SER QL: NORMAL

## 2019-12-31 LAB
HSV1 IGG SERPL QL IA: POSITIVE
HSV2 IGG SERPL QL IA: POSITIVE

## 2020-01-11 ENCOUNTER — TELEPHONE (OUTPATIENT)
Dept: URGENT CARE | Facility: CLINIC | Age: 65
End: 2020-01-11

## 2020-01-11 ENCOUNTER — PATIENT MESSAGE (OUTPATIENT)
Dept: URGENT CARE | Facility: CLINIC | Age: 65
End: 2020-01-11

## 2020-01-11 RX ORDER — VALACYCLOVIR HYDROCHLORIDE 1 G/1
1000 TABLET, FILM COATED ORAL 3 TIMES DAILY
Qty: 21 TABLET | Refills: 3 | Status: SHIPPED | OUTPATIENT
Start: 2020-01-11 | End: 2020-01-18

## 2020-02-20 ENCOUNTER — OFFICE VISIT (OUTPATIENT)
Dept: URGENT CARE | Facility: CLINIC | Age: 65
End: 2020-02-20
Payer: OTHER MISCELLANEOUS

## 2020-02-20 VITALS
RESPIRATION RATE: 16 BRPM | HEART RATE: 69 BPM | OXYGEN SATURATION: 98 % | DIASTOLIC BLOOD PRESSURE: 66 MMHG | WEIGHT: 156 LBS | BODY MASS INDEX: 24.48 KG/M2 | TEMPERATURE: 99 F | HEIGHT: 67 IN | SYSTOLIC BLOOD PRESSURE: 108 MMHG
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
## 2020-02-20 PROCEDURE — 86703 HIV-1/HIV-2 1 RESULT ANTBDY: CPT | Mod: S$GLB,,, | Performed by: EMERGENCY MEDICINE

## 2020-02-20 PROCEDURE — 86703 HIV 1 / 2 ANTIBODY: ICD-10-PCS | Mod: S$GLB,,, | Performed by: EMERGENCY MEDICINE

## 2020-02-20 PROCEDURE — 86703 HIV-1/HIV-2 1 RESULT ANTBDY: CPT

## 2020-02-20 PROCEDURE — 99203 PR OFFICE/OUTPT VISIT, NEW, LEVL III, 30-44 MIN: ICD-10-PCS | Mod: S$GLB,,, | Performed by: EMERGENCY MEDICINE

## 2020-02-20 PROCEDURE — 80074 ACUTE HEPATITIS PANEL: CPT | Mod: S$GLB,,, | Performed by: EMERGENCY MEDICINE

## 2020-02-20 PROCEDURE — 86592 SYPHILIS TEST NON-TREP QUAL: CPT

## 2020-02-20 PROCEDURE — 80074 HEPATITIS PANEL, ACUTE: ICD-10-PCS | Mod: S$GLB,,, | Performed by: EMERGENCY MEDICINE

## 2020-02-20 PROCEDURE — 99203 OFFICE O/P NEW LOW 30 MIN: CPT | Mod: S$GLB,,, | Performed by: EMERGENCY MEDICINE

## 2020-02-20 PROCEDURE — 80074 ACUTE HEPATITIS PANEL: CPT

## 2020-02-20 NOTE — PATIENT INSTRUCTIONS
YOU WERE SEEN INITIALLY 11/12/2019  TODAY REPEAT BLOOD DRAW DONE AT 3 MONTH INTERVAL  WE WILL CALL YOU WITH RESULTS  YOU NEED TO RETURN FOR LAST BLOOD DRAW IN 3 MONTHS FROM NOW  RETURN 5/20/20 FOR REPEAT LABS AND LAST VISIT.

## 2020-02-20 NOTE — PROGRESS NOTES
"Subjective:       Patient ID: Myke Domingo is a 64 y.o. male.    Vitals:  height is 5' 7" (1.702 m) and weight is 70.8 kg (156 lb). His temperature is 98.6 °F (37 °C). His blood pressure is 108/66 and his pulse is 69. His respiration is 16 and oxygen saturation is 98%.     Chief Complaint: No chief complaint on file.    Patient is here for a w/c follow up.  PATIENT IS HERE 3 MONTHS AFTER HIS INITIAL A NEEDLESTICK INJURY.  HIS HIV, RPR, HEPATITIS PANEL WAS NEGATIVE AND HE RECEIVED A LETTER THAT HE NEEDED TO FOLLOW UP.  HE HAS NO SYMPTOMS.  IN HIS VERSION TOMAS HERPES SIMPLEX VIRUS TEST WAS ORDERED AT LAST VISIT WHICH IS POSITIVE FOR HSV 1 AND 2.  HE DOES NOT HAVE ANY RASH AND HE DOES HAVE VALTREX.  THIS IS NOT NEW NEWS FOR HIM.  HE IS QUITE UNSURE WHY HE IS HERE HOWEVER WITH THE NEEDLESTICK PROTOCOL HE IS HERE FOR HIS THREE-MONTH BLOOD DRAW AND HE WILL BE ADVISED TO RETURN IN 3 MONTHS FOR 6 MONTH BLOOD DRAW.  THIS MESSAGE WILL BE CONVEYED TO ROBERT.      Constitution: Negative for chills, fatigue and fever.   HENT: Negative for congestion and sore throat.    Neck: Negative for painful lymph nodes.   Cardiovascular: Negative for chest pain and leg swelling.   Eyes: Negative for double vision and blurred vision.   Respiratory: Negative for cough and shortness of breath.    Gastrointestinal: Negative for nausea, vomiting and diarrhea.   Genitourinary: Negative for dysuria, frequency and urgency.   Musculoskeletal: Negative for joint pain, joint swelling, muscle cramps and muscle ache.   Skin: Negative for color change, pale and rash.   Allergic/Immunologic: Negative for seasonal allergies.   Neurological: Negative for dizziness, history of vertigo, light-headedness, passing out and headaches.   Hematologic/Lymphatic: Negative for swollen lymph nodes, easy bruising/bleeding and history of blood clots. Does not bruise/bleed easily.   Psychiatric/Behavioral: Negative for nervous/anxious, sleep disturbance and " depression. The patient is not nervous/anxious.        Objective:      Physical Exam   Constitutional: He is oriented to person, place, and time. He appears well-developed and well-nourished. He is cooperative.  Non-toxic appearance. He does not have a sickly appearance. He does not appear ill. No distress.   HENT:   Head: Normocephalic and atraumatic.   Right Ear: Hearing, tympanic membrane, external ear and ear canal normal.   Left Ear: Hearing, tympanic membrane, external ear and ear canal normal.   Nose: Nose normal. No mucosal edema, rhinorrhea or nasal deformity. No epistaxis. Right sinus exhibits no maxillary sinus tenderness and no frontal sinus tenderness. Left sinus exhibits no maxillary sinus tenderness and no frontal sinus tenderness.   Mouth/Throat: Uvula is midline, oropharynx is clear and moist and mucous membranes are normal. No trismus in the jaw. Normal dentition. No uvula swelling. No oropharyngeal exudate, posterior oropharyngeal edema or posterior oropharyngeal erythema.   Eyes: Conjunctivae and lids are normal. No scleral icterus.   Neck: Trachea normal, full passive range of motion without pain and phonation normal. Neck supple. No neck rigidity. No edema and no erythema present.   Cardiovascular: Normal rate, regular rhythm, normal heart sounds, intact distal pulses and normal pulses.   Pulmonary/Chest: Effort normal and breath sounds normal. No respiratory distress. He has no decreased breath sounds. He has no rhonchi.   Abdominal: Normal appearance.   Musculoskeletal: Normal range of motion. He exhibits no edema or deformity.   Neurological: He is alert and oriented to person, place, and time. He exhibits normal muscle tone. Coordination normal.   Skin: Skin is warm, dry, intact, not diaphoretic and not pale.   Psychiatric: He has a normal mood and affect. His speech is normal and behavior is normal. Judgment and thought content normal. Cognition and memory are normal.   Nursing note and  vitals reviewed.        Assessment:       1. Needle stick, hypodermic, accidental, subsequent encounter        Plan:         Needle stick, hypodermic, accidental, subsequent encounter  -     HIV 1/2 Ag/Ab (4th Gen)  -     HEPATITIS PANEL, ACUTE  -     RPR          Patient Instructions   YOU WERE SEEN INITIALLY 11/12/2019  TODAY REPEAT BLOOD DRAW DONE AT 3 MONTH INTERVAL  WE WILL CALL YOU WITH RESULTS  YOU NEED TO RETURN FOR LAST BLOOD DRAW IN 3 MONTHS FROM NOW  RETURN 5/20/20 FOR REPEAT LABS AND LAST VISIT.

## 2020-02-21 LAB
HAV IGM SERPL QL IA: NEGATIVE
HBV CORE IGM SERPL QL IA: NEGATIVE
HBV SURFACE AG SERPL QL IA: NEGATIVE
HCV AB SERPL QL IA: NEGATIVE
HIV 1+2 AB+HIV1 P24 AG SERPL QL IA: NEGATIVE
RPR SER QL: NORMAL

## 2020-03-11 NOTE — LETTER
[facility] [address] [facility], [place] [address]  Phone: [phone]  Fax: [phone]  [facility]: [phone]    Pt Name: Myke Domingo  Injury Date: 09/07/2019   Employee ID:  Date of First Treatment: 09/19/2019   Company: [id number]       Appointment Time: 07:50 AM Arrived: 7:50 AM   Provider: Danni Stuart PA-C Time Out: 8:23 AM      Office Treatment:   1. Strain of lumbar region, subsequent encounter    2. Work related injury    3. Muscle spasm          Patient Instructions: Attention not to aggravate affected area, Daily home exercises/warm soaks    Restrictions: Regular Duty     Return Appointment: 9/25/2019 at 8:10 AM          2 = assistive person

## 2020-05-25 ENCOUNTER — OFFICE VISIT (OUTPATIENT)
Dept: URGENT CARE | Facility: CLINIC | Age: 65
End: 2020-05-25
Payer: COMMERCIAL

## 2020-05-25 VITALS
OXYGEN SATURATION: 98 % | HEART RATE: 67 BPM | WEIGHT: 156 LBS | BODY MASS INDEX: 24.48 KG/M2 | TEMPERATURE: 98 F | HEIGHT: 67 IN

## 2020-05-25 DIAGNOSIS — Z11.59 SCREENING FOR VIRAL DISEASE: Primary | ICD-10-CM

## 2020-05-25 PROCEDURE — U0003 INFECTIOUS AGENT DETECTION BY NUCLEIC ACID (DNA OR RNA); SEVERE ACUTE RESPIRATORY SYNDROME CORONAVIRUS 2 (SARS-COV-2) (CORONAVIRUS DISEASE [COVID-19]), AMPLIFIED PROBE TECHNIQUE, MAKING USE OF HIGH THROUGHPUT TECHNOLOGIES AS DESCRIBED BY CMS-2020-01-R: HCPCS

## 2020-05-25 PROCEDURE — 99211 OFF/OP EST MAY X REQ PHY/QHP: CPT | Mod: S$GLB,,, | Performed by: NURSE PRACTITIONER

## 2020-05-25 PROCEDURE — 99211 PR OFFICE/OUTPT VISIT, EST, LEVL I: ICD-10-PCS | Mod: S$GLB,,, | Performed by: NURSE PRACTITIONER

## 2020-05-26 ENCOUNTER — TELEPHONE (OUTPATIENT)
Dept: URGENT CARE | Facility: CLINIC | Age: 65
End: 2020-05-26

## 2020-05-26 DIAGNOSIS — U07.1 COVID-19 VIRUS DETECTED: ICD-10-CM

## 2020-05-26 LAB — SARS-COV-2 RNA RESP QL NAA+PROBE: DETECTED

## 2020-05-26 NOTE — TELEPHONE ENCOUNTER
Patients daughter returned call to assist with translation. Gave POS Covid results. States father is totally asymptomatic. Answered all questions.    Your test was POSITIVE for COVID-19 (coronavirus).       Prevention steps for patients with confirmed COVID-19       Stay home and stay away from family members and friends. The CDC says, you can leave home after these three things have happened: 1) You have had no fever for at least 72 hours (that is three full days of no fever without the use of medicine that reduces fevers) 2) AND other symptoms have improved (for example, when your cough or shortness of breath have improved) 3) AND at least 7 days have passed since your symptoms first appeared.   Separate yourself from other people and animals in your home.   Call ahead before visiting your doctor.   Wear a facemask.   Cover your coughs and sneezes.   Wash your hands often with soap and water; hand  can be used, too.   Avoid sharing personal household items.   Wipe down surfaces used daily.   Monitor your symptoms. Seek prompt medical attention if your illness is worsening (e.g., difficulty breathing).    Before seeking care, call your healthcare provider.   If you have a medical emergency and need to call 911, notify the dispatch personnel that you have, or are being evaluated for COVID-19. If possible, put on a facemask before emergency medical services arrive.        Recommended precautions for household members, intimate partners, and caregivers in a home setting of a patient with symptomatic laboratory-confirmed COVID-19 or a patient under investigation.  Household members, intimate partners, and caregivers in the home setting awaiting tests results have close contact with a person with symptomatic, laboratory-confirmed COVID-19 or a person under investigation. Close contacts should monitor their health; they should call their provider right away if they develop symptoms suggestive of  COVID-19 (e.g., fever, cough, shortness of breath).    Close contacts should also follow these recommendations:   Make sure that you understand and can help the patient follow their provider's instructions for medication(s) and care. You should help the patient with basic needs in the home and provide support for getting groceries, prescriptions, and other personal needs.   Monitor the patient's symptoms. If the patient is getting sicker, call his or her healthcare provider and tell them that the patient has laboratory-confirmed COVID-19. If the patient has a medical emergency and you need to call 911, notify the dispatch personnel that the patient has, or is being evaluated for COVID-19.   Household members should stay in another room or be  from the patient. Household members should use a separate bedroom and bathroom, if available.   Prohibit visitors.   Household members should care for any pets in the home.   Make sure that shared spaces in the home have good air flow, such as by an air conditioner or an opened window, weather permitting.   Perform hand hygiene frequently. Wash your hands often with soap and water for at least 20 seconds or use an alcohol-based hand  (that contains > 60% alcohol) covering all surfaces of your hands and rubbing them together until they feel dry. Soap and water should be used preferentially.   Avoid touching your eyes, nose, and mouth.   The patient should wear a facemask. If the patient is not able to wear a facemask (for example, because it causes trouble breathing), caregivers should wear a mask when they are in the same room as the patient.   Wear a disposable facemask and gloves when you touch or have contact with the patient's blood, stool, or body fluids, such as saliva, sputum, nasal mucus, vomit, urine.  o Throw out disposable facemasks and gloves after using them. Do not reuse.  o When removing personal protective equipment, first remove and  dispose of gloves. Then, immediately clean your hands with soap and water or alcohol-based hand . Next, remove and dispose of facemask, and immediately clean your hands again with soap and water or alcohol-based hand .   You should not share dishes, drinking glasses, cups, eating utensils, towels, bedding, or other items with the patient. After the patient uses these items, you should wash them thoroughly (see below Wash laundry thoroughly).   Clean all high-touch surfaces, such as counters, tabletops, doorknobs, bathroom fixtures, toilets, phones, keyboards, tablets, and bedside tables, every day. Also, clean any surfaces that may have blood, stool, or body fluids on them.   Use a household cleaning spray or wipe, according to the label instructions. Labels contain instructions for safe and effective use of the cleaning product including precautions you should take when applying the product, such as wearing gloves and making sure you have good ventilation during use of the product.   Wash laundry thoroughly.  o Immediately remove and wash clothes or bedding that have blood, stool, or body fluids on them.  o Wear disposable gloves while handling soiled items and keep soiled items away from your body. Clean your hands (with soap and water or an alcohol-based hand ) immediately after removing your gloves.  o Read and follow directions on labels of laundry or clothing items and detergent. In general, using a normal laundry detergent according to washing machine instructions and dry thoroughly using the warmest temperatures recommended on the clothing label.   Place all used disposable gloves, facemasks, and other contaminated items in a lined container before disposing of them with other household waste. Clean your hands (with soap and water or an alcohol-based hand ) immediately after handling these items. Soap and water should be used preferentially if hands are visibly  dirty.   Discuss any additional questions with your state or local health department or healthcare provider. Check available hours when contacting your local health department.    For more information see CDC link below.      https://www.cdc.gov/coronavirus/2019-ncov/hcp/guidance-prevent-spread.html#precautions        Sources:  [facility], [place] Department of Health and [facility]     Sincerely,     Edison Abdi III, NP

## 2020-05-26 NOTE — TELEPHONE ENCOUNTER
Patient speaks little english, will have son call back for results.  Patient sopunded well on phone and in no respirtatory distress.

## 2020-06-08 ENCOUNTER — OFFICE VISIT (OUTPATIENT)
Dept: URGENT CARE | Facility: CLINIC | Age: 65
End: 2020-06-08
Payer: COMMERCIAL

## 2020-06-08 VITALS
DIASTOLIC BLOOD PRESSURE: 84 MMHG | TEMPERATURE: 99 F | HEART RATE: 92 BPM | HEIGHT: 67 IN | OXYGEN SATURATION: 98 % | WEIGHT: 156 LBS | SYSTOLIC BLOOD PRESSURE: 128 MMHG | BODY MASS INDEX: 24.48 KG/M2

## 2020-06-08 DIAGNOSIS — Z71.89 EDUCATED ABOUT COVID-19 VIRUS INFECTION: Primary | ICD-10-CM

## 2020-06-08 DIAGNOSIS — Z03.818 ENCOUNTER FOR OBSERVATION FOR SUSPECTED EXPOSURE TO OTHER BIOLOGICAL AGENTS RULED OUT: ICD-10-CM

## 2020-06-08 PROCEDURE — 99211 PR OFFICE/OUTPT VISIT, EST, LEVL I: ICD-10-PCS | Mod: S$GLB,,, | Performed by: NURSE PRACTITIONER

## 2020-06-08 PROCEDURE — U0003 INFECTIOUS AGENT DETECTION BY NUCLEIC ACID (DNA OR RNA); SEVERE ACUTE RESPIRATORY SYNDROME CORONAVIRUS 2 (SARS-COV-2) (CORONAVIRUS DISEASE [COVID-19]), AMPLIFIED PROBE TECHNIQUE, MAKING USE OF HIGH THROUGHPUT TECHNOLOGIES AS DESCRIBED BY CMS-2020-01-R: HCPCS

## 2020-06-08 PROCEDURE — 99211 OFF/OP EST MAY X REQ PHY/QHP: CPT | Mod: S$GLB,,, | Performed by: NURSE PRACTITIONER

## 2020-06-08 NOTE — PROGRESS NOTES
"Subjective:       Patient ID: Myke Domingo is a 64 y.o. male.    Vitals:  height is 5' 7" (1.702 m) and weight is 70.8 kg (156 lb). His temperature is 98.5 °F (36.9 °C). His blood pressure is 128/84 and his pulse is 92. His oxygen saturation is 98%.     Chief Complaint: COVID-19 Concerns    Pt is beeing seen to be retested for the virus because he was tested positive 15 days ago. Pt is not having any symptoms     ROS    Objective:      Physical Exam      Patient is here for COVID screening and is currently asymptomatic. Counseling given. Isolation guidelines discussed. All questions answered. Nasal swab performed.       Assessment:       1. Educated About Covid-19 Virus Infection    2. Encounter for observation for suspected exposure to other biological agents ruled out        Plan:         HPI COLLECTED VIA FABIANA    Educated About Covid-19 Virus Infection    Encounter for observation for suspected exposure to other biological agents ruled out  -     COVID-19 Routine Screening           Patient Instructions   [facility]  Prevenir la propagación del Coronoavirus 2019 en hogares y comunidades residenciales      Pasos preventivos para personas con COVID-19 o que se sospecha que están infectadas (incluidas personas bajo investigación) que no necesitan estar hospitalizadas y personas infectadas con COVID-19 que hayan estado hospitalizadas nimesh que se determinan medicamente estables para irse a casa.    Casiano médico y el personal de sanidad pública evaluarán si usted puede ser tratado en casa.   Quédese en casa excepto para obtener cuidados médicos.   Sepárese de otras personas y animales en casiano hogar.   Llame por teléfono antes de ir a gordon a casiano médico.   Póngase dejuan mascarilla.   Tápese al toser y estornudar.   Lávese las minerva con frecuencia.   Evite compartir objetos personales en casiano hogar.   Limpie todas las superficies a casiano alcance todos los días.    Monitoree kenny " síntomas. Consiga ayuda médica si la enfermedad empeora (por ejemplo, dificultad para respirar). Antes de salir a buscar ayuda, llame a casiano proveedor médico.    Si tiene dejuan emergencia médica y necesita llamar al 911, notifique al personal que responda a casiano llamada de que usted tiene, o está siendo evaluado para COVID-19. Si es posible, póngase dejuan mascarilla antes de que la ayuda sanitaria llegue.   Dejar de hacer aislamiento en casa. Llame a casiano médico para que le asesore sobre si puede dejar de hacer aislamiento en casa.    Precauciones recomendadas para miembros del mismo hogar, compañeros íntimos y cuidadores, fuera del ámbito médico, de un paciente sintomático confirmado positivo para COVID-19 o un paciente que está siendo investigado.  Miembros del mismo hogar, compañeros íntimos y cuidadores, fuera del ámbito médico, pueden zohaib estado en contacto con dejuan persona con síntomas confirmada positivo para COVID-19 o dejuan persona bajo investigación. Personas con contacto cercano deberían monitorizar casiano ximena; deberían llamar a casiano proveedor médico inmediatamente si desarrollan síntomas que sugieren que puede zohaib contraído COVID-19 (por ejemplo, fiebre, tos, falta de aliento).    Personas con contacto cercano deberían, además, seguir las siguientes recomendaciones:   Asegúrese de que usted puede entender y ayudar al paciente a seguir las instrucciones de casiano proveedor médico, en relación con la medicación y el cuidado a seguir. Debería ayudar al paciente con kenny necesidades básicas en casa y ayudar cuando sea necesario a hacer la compra, ir a recoger las recetas médicas y otras necesidades personales.   Monitorear los síntomas del paciente. Si el paciente empeora, llame a casiano proveedor médico y dígale que el paciente [place] sido confirmado positivo para COVID-19. Dinosaur ayudará a la oficina de casiano médico a vidya las medidas adecuadas para evitar que otras personas en la oficina o en la nate de espera se infecten. Pida al  proveedor médico que llame al departamento de ximena del estado para más instrucciones. Si el paciente tiene dejuan emergencia médica y tiene que llamar al 911, informe al operador que responda a casiano llamada de que el paciente tiene o está siendo evaluado para COVID-19.   Miembros del mismo hogar deberían quedarse en habitaciones separadas del paciente lo kobe posible. Miembros del mismo hogar deberían utilizar otro dormitorio y cuarto de baño, si fuera posible.    Prohibir la visita de cualquier persona que no necesite estar en la casa.   Miembros del mismo hogar deberían ocuparse de las mascotas de la casa. No cuide de animales o mascotas mientras esté enfermo.   Asegúrese de que las áreas comunes de la casa tienen buena ventilación, mina aire acondicionado o dejuan ventana que se pueda abrir si el clima lo permite.   Lávese las minerva frecuentemente. Lávese las minerva frecuentemente con jabón y agua nelly al menos 20 segundos o utilice un desinfectante de minerva con base de alcohol, que contenga entre 60 y 95% de alcohol. Cubriendo todas las superficies de las minerva y frotándolas juntas hasta que se sequen.   Evite tocarse los ojos, la nariz y la boca antes de haberse lavado las minerva.   El paciente debería llevar mascarilla cuando esté con otras personas. Si el paciente no se puede poner dejuan mascarilla porque, por ejemplo, tiene dificultad para respirar, usted, mina cuidador, debería ponerse dejuan mascarilla cuando esté en la misma habitación que el paciente.   Utilice dejuan mascarilla desechable y guantes cuando toque o esté en contacto con la sarbjit del paciente, kenny heces, kenny fluidos corporales tales mina saliva, esputo, mucosidad nasal, vómito, orina.   o Tire las mascarillas desechables y los guantes kathya pues de utilizarlos. No los reutilice.  o Cuando se quite la protección, lou quítese los guantes. Inmediatamente después lávese las minerva con agua y jabón o con un desinfectante de minerva con base de  alcohol. Después quítese y tire la mascarilla, e inmediatamente después lávese las minerva otra vez con agua y jabón o utilice un desinfectante de minerva con base de alcohol.   Evite compartir objetos del hogar con el paciente. No debería compartir platos, vasos, tazas, cubiertos, toallas, ropa de cama u otros objetos. Después de que el paciente utilice estos objetos debe lavarlos minuciosamente (cesar debajo Rei la colada minuciosamente).   Limpie todas las superficies de fácil alcance, mina las encimeras, las mesas, los pomos de las jsu, los picaportes del baño, el retrete, teléfonos, teclados, tabletas y las mesillas de noche, todos los días. Además, lave cualquier superficie que pueda tener sarbjit, heces o fluidos corporales.   Utilice los productos de limpieza o las toallitas según indiquen las etiquetas de los mismos. Las etiquetas contienen la información para utilizar estos productos de manera chacon y eficiente, además de las precauciones que debe vidya al utilizar dichos productos, tales mina el uso de guantes o buena ventilación.   Lave la colada minuciosamente.  o Retire inmediatamente cualquier prenda o ropa de cama que tenga sarbjit, heces o fluidos corporales.  o Utilice guantes desechables cuando toque objetos sucios y separe los objetos sucios de casiano cuerpo. Lávese las minerva (con jabón y agua o con un desinfectante de minerva con base de alcohol) inmediatamente después de quitarse los guantes.  o Daja y siga las instrucciones en las etiquetas de la ropa y el detergente. En general, utilice un detergente corriente siguiendo las instrucciones de la lavadora y séquelo meticulosamente utilizando la temperatura mas lydia recomendada en la etiqueta de la ropa.   Coloque todos los guantes desechables, las mascarillas y otros objetos contaminados en un contenedor reforzado antes de tirarlo junto con otros desechos del hogar. Lávese las minerva (con jabón y agua o con un desinfectante de minerva con base de  alcohol) inmediatamente después de tocar estos objetos. Si las minerva están visiblemente sucias se recomienda lavarlas con agua y con jabón.    Consulte cualquier otra pregunta con casiano departamento de ximena local o estatal o con casiano proveedor médico. Compruebe las horas en las que se puede contactar al departamento de ximena local.    Para más información, siga el enlace del CDC que encontrará a continuación.    https://www.cdc.gov/coronavirus/2019-ncov/hcp/guidance-prevent-spread-sp.html

## 2020-06-08 NOTE — PATIENT INSTRUCTIONS
[facility]  Prevenir la propagación del Coronoavirus 2019 en hogares y comunidades residenciales      Pasos preventivos para personas con COVID-19 o que se sospecha que están infectadas (incluidas personas bajo investigación) que no necesitan estar hospitalizadas y personas infectadas con COVID-19 que hayan estado hospitalizadas nimesh que se determinan medicamente estables para irse a casa.    Casiano médico y el personal de sanidad pública evaluarán si usted puede ser tratado en casa.   Quédese en casa excepto para obtener cuidados médicos.   Sepárese de otras personas y animales en casiano hogar.   Llame por teléfono antes de ir a gordon a casiano médico.   Póngase dejuan mascarilla.   Tápese al toser y estornudar.   Lávese las minerva con frecuencia.   Evite compartir objetos personales en casiano hogar.   Limpie todas las superficies a casiano alcance todos los días.    Monitoree kenny síntomas. Consiga ayuda médica si la enfermedad empeora (por ejemplo, dificultad para respirar). Antes de salir a buscar ayuda, llame a casiano proveedor médico.    Si tiene dejuan emergencia médica y necesita llamar al 911, notifique al personal que responda a casiano llamada de que usted tiene, o está siendo evaluado para COVID-19. Si es posible, póngase dejuan mascarilla antes de que la ayuda sanitaria llegue.   Dejar de hacer aislamiento en casa. Llame a casiano médico para que le asesore sobre si puede dejar de hacer aislamiento en casa.    Precauciones recomendadas para miembros del mismo hogar, compañeros íntimos y cuidadores, fuera del ámbito médico, de un paciente sintomático confirmado positivo para COVID-19 o un paciente que está siendo investigado.  Miembros del mismo hogar, compañeros íntimos y cuidadores, fuera del ámbito médico, pueden zohaib estado en contacto con dejuan persona con síntomas confirmada positivo para COVID-19 o dejuan persona bajo investigación. Personas con contacto cercano deberían monitorizar casiano ximena; deberían  llamar a casiano proveedor médico inmediatamente si desarrollan síntomas que sugieren que puede zohaib contraído COVID-19 (por ejemplo, fiebre, tos, falta de aliento).    Personas con contacto cercano deberían, además, seguir las siguientes recomendaciones:   Asegúrese de que usted puede entender y ayudar al paciente a seguir las instrucciones de casiano proveedor médico, en relación con la medicación y el cuidado a seguir. Debería ayudar al paciente con kenny necesidades básicas en casa y ayudar cuando sea necesario a hacer la compra, ir a recoger las recetas médicas y otras necesidades personales.   Monitorear los síntomas del paciente. Si el paciente empeora, llame a casiano proveedor médico y dígale que el paciente [place] sido confirmado positivo para COVID-19. [place] ayudará a la oficina de casiano médico a vidya las medidas adecuadas para evitar que otras personas en la oficina o en la nate de espera se infecten. Pida al proveedor médico que llame al departamento de ximena del estado para más instrucciones. Si el paciente tiene dejuan emergencia médica y tiene que llamar al 911, informe al operador que responda a casiano llamada de que el paciente tiene o está siendo evaluado para COVID-19.   Miembros del mismo hogar deberían quedarse en habitaciones separadas del paciente lo kobe posible. Miembros del mismo hogar deberían utilizar otro dormitorio y cuarto de baño, si fuera posible.    Prohibir la visita de cualquier persona que no necesite estar en la casa.   Miembros del mismo hogar deberían ocuparse de las mascotas de la casa. No cuide de animales o mascotas mientras esté enfermo.   Asegúrese de que las áreas comunes de la casa tienen buena ventilación, mina aire acondicionado o dejuan ventana que se pueda abrir si el clima lo permite.   Lávese las minerva frecuentemente. Lávese las minerva frecuentemente con jabón y agua nelly al menos 20 segundos o utilice un desinfectante de minerva con base de alcohol, que contenga entre 60 y 95% de  alcohol. Cubriendo todas las superficies de las minerva y frotándolas juntas hasta que se sequen.   Evite tocarse los ojos, la nariz y la boca antes de haberse lavado las minerva.   El paciente debería llevar mascarilla cuando esté con otras personas. Si el paciente no se puede poner dejuan mascarilla porque, por ejemplo, tiene dificultad para respirar, usted, mina cuidador, debería ponerse dejuan mascarilla cuando esté en la misma habitación que el paciente.   Utilice dejuan mascarilla desechable y guantes cuando toque o esté en contacto con la sarbjit del paciente, kenny heces, kenny fluidos corporales tales mina saliva, esputo, mucosidad nasal, vómito, orina.   o Tire las mascarillas desechables y los guantes kathya pues de utilizarlos. No los reutilice.  o Cuando se quite la protección, lou quítese los guantes. Inmediatamente después lávese las minerva con agua y jabón o con un desinfectante de minerva con base de alcohol. Después quítese y tire la mascarilla, e inmediatamente después lávese las minerva otra vez con agua y jabón o utilice un desinfectante de minerva con base de alcohol.   Evite compartir objetos del hogar con el paciente. No debería compartir platos, vasos, tazas, cubiertos, toallas, ropa de cama u otros objetos. Después de que el paciente utilice estos objetos debe lavarlos minuciosamente (cesar debajo Rei la colada minuciosamente).   Limpie todas las superficies de fácil alcance, mina las encimeras, las mesas, los pomos de las jus, los picaportes del baño, el retrete, teléfonos, teclados, tabletas y las mesillas de noche, todos los días. Además, lave cualquier superficie que pueda tener sarbjit, heces o fluidos corporales.   Utilice los productos de limpieza o las toallitas según indiquen las etiquetas de los mismos. Las etiquetas contienen la información para utilizar estos productos de manera chacon y eficiente, además de las precauciones que debe vidya al utilizar dichos productos, tales mina el uso de  guantes o buena ventilación.   Lave la colada minuciosamente.  o Retire inmediatamente cualquier prenda o ropa de cama que tenga sarbjit, heces o fluidos corporales.  o Utilice guantes desechables cuando toque objetos sucios y separe los objetos sucios de casiano cuerpo. Lávese las minerva (con jabón y agua o con un desinfectante de minerva con base de alcohol) inmediatamente después de quitarse los guantes.  o Daja y siga las instrucciones en las etiquetas de la ropa y el detergente. En general, utilice un detergente corriente siguiendo las instrucciones de la lavadora y séquelo meticulosamente utilizando la temperatura mas lydia recomendada en la etiqueta de la ropa.   Coloque todos los guantes desechables, las mascarillas y otros objetos contaminados en un contenedor reforzado antes de tirarlo junto con otros desechos del hogar. Lávese las minerva (con jabón y agua o con un desinfectante de minerva con base de alcohol) inmediatamente después de tocar estos objetos. Si las minerva están visiblemente sucias se recomienda lavarlas con agua y con jabón.    Consulte cualquier otra pregunta con casiano departamento de ximena local o estatal o con casiano proveedor médico. Compruebe las horas en las que se puede contactar al departamento de ximena local.    Para más información, siga el enlace del CDC que encontrará a continuación.    https://www.cdc.gov/coronavirus/2019-ncov/hcp/guidance-prevent-spread-sp.html

## 2020-06-09 ENCOUNTER — TELEPHONE (OUTPATIENT)
Dept: URGENT CARE | Facility: CLINIC | Age: 65
End: 2020-06-09

## 2020-06-09 LAB — SARS-COV-2 RNA RESP QL NAA+PROBE: NOT DETECTED
